# Patient Record
Sex: MALE | Race: WHITE | NOT HISPANIC OR LATINO | URBAN - METROPOLITAN AREA
[De-identification: names, ages, dates, MRNs, and addresses within clinical notes are randomized per-mention and may not be internally consistent; named-entity substitution may affect disease eponyms.]

---

## 2021-02-11 DIAGNOSIS — Z23 ENCOUNTER FOR IMMUNIZATION: ICD-10-CM

## 2023-02-21 NOTE — PROGRESS NOTES
New Patient Consult Note      Chief Complaint   Patient presents with   • Diabetes Type 2      Referring Provider  Serena Ledezma Md  [address] [facility]     History of Present Illness:   Sally Diaz is a 65 y o  male with a history of type 2 diabetes since 1020 With complication of macroalbuminuria, CKD3, denies any retinopathy, neuropathy or Macrovascular complications  With other PMHx of hypertension, hyperlipidemia who presents today for diabetes management  Used to see Dr Alice Woodruff who now retired and therefore patient here to transfer care  We do not have any records from previous endocrine at this time  Patient was first diagnosed with T2DM in since [address] [facility] since diagnosis:- Reports good control for all these years  Reports last HbA1C 11/22 was 6 7%  Medications tried thus far:- Took patient off metformin and started on humalog 50/50- 1 5 months ago possible d/t decline in renal function  Current regime:- Humalog 50:50 30u BID, Jardiance 10mg daily   BG control:- Checks BG BID, Did not bring meter  Fasting- 170-220  Predinner-   Hypoglycemic episodes: Reports low BG <70 once or twice a month, starts to see spots in eyes and sweaty  Hyperglycemia symptoms:- Does report polyuria or polydipsia for a while- patient on bumex, no chest pain, dyspnea or TIAs, no numbness, tingling or pain in extremities  Diet:- Does not follow diabetic diet but stays strict to a diet  Activity- Minimal activity, retired now   Limited d/t OA of the knee    Opthamology: Nov 2022- no retinopathy, no macular edema, saw Dr Sanat Franklin- Ophthalmology associated in [address] podiatry- unsure name   [facility]- Dr Marie Hsieh  Hx of hyperlipidemia:- Yes on lipitor 40mg daily  Hx of hypertension:- Yes, Carvedilol 6 25mg BID, Catapres 0 1mg, Irbesartan 300mg   Last Lipid:- 01/2021  Last urine microalbumin:- 01/2021    Social Hx:- Does not smoke, occasional alcohol use, No other drugs  Lives with spouse     Family HX:- Father had T2DM    Patient Active Problem List   Diagnosis   • Type 2 diabetes mellitus with hyperglycemia, with long-term current use of insulin ([facility] [address] )   • Hypertension   • Mixed hyperlipidemia   • Chronic systolic congestive heart failure (HCC)   • Type 2 diabetes mellitus with stage 3a chronic kidney disease, with long-term current use of insulin (HCC)      Past Medical History:   Diagnosis Date   • Hypertension       Past Surgical History:   Procedure Laterality Date   • BUNIONECTOMY        Family History   Problem Relation Age of Onset   • Heart disease Mother    • Diabetes unspecified Father    • Diabetes unspecified Sister      Social History     Tobacco Use   • Smoking status: Former     Types: Cigarettes     Quit date:      Years since quittin 1   • Smokeless tobacco: Never   Substance Use Topics   • Alcohol use: Not on file     Allergies   Allergen Reactions   • Pioglitazone Swelling     Other reaction(s): lower extremity edema     • Penicillins Hives and Rash         Current Outpatient Medications:   •  amLODIPine (Norvasc) 10 mg tablet, Take 10 mg by mouth daily, Disp: , Rfl:   •  ASPIRIN 81 PO, Take by mouth, Disp: , Rfl:   •  atorvastatin (LIPITOR) 40 mg tablet, TAKE 1 TABLET BY MOUTH AT BEDTIME FOR 90 DAYS, Disp: , Rfl:   •  bumetanide (BUMEX) 1 mg tablet, Take 2 mg by mouth daily, Disp: , Rfl:   •  carvedilol (COREG) 6 25 mg tablet, Take 6 25 mg by mouth 2 (two) times a day, Disp: , Rfl:   •  cloNIDine (CATAPRES) 0 1 mg tablet, TAKE 1 TABLET BY MOUTH TWICE DAILY FOR 30 DAYS, Disp: , Rfl:   •  HumaLOG Mix 50/50 KwikPen (50-50) 100 units/mL injection pen, 30-34 Units 2 (two) times a day with meals 30u in morning and 34u at night, Disp: 15 mL, Rfl: 1  •  irbesartan (AVAPRO) 300 mg tablet, Take 1 tablet by mouth daily, Disp: , Rfl:   •  Jardiance 10 MG TABS tablet, Take 10 mg by mouth daily before breakfast, Disp: , Rfl:   • latanoprost (XALATAN) 0 005 % ophthalmic solution, Administer 1 drop to both eyes daily at bedtime, Disp: , Rfl:   Review of Systems   Constitutional: Negative for diaphoresis, fatigue and unexpected weight change  Respiratory: Negative for shortness of breath  Cardiovascular: Negative for chest pain and palpitations  Gastrointestinal: Negative for constipation and diarrhea  Endocrine: Negative for polydipsia and polyuria  Physical Exam:  Body mass index is 27 86 kg/m²  /52   Pulse (!) 44   Ht 6' 1" (1 854 m)   Wt 95 8 kg (211 lb 3 2 oz)   SpO2 96%   BMI 27 86 kg/m²    Wt Readings from Last 3 Encounters:   02/22/23 95 8 kg (211 lb 3 2 oz)       Physical Exam  Constitutional:       Appearance: Normal appearance  Cardiovascular:      Rate and Rhythm: Normal rate and regular rhythm  Pulses: Pulses are weak  Dorsalis pedis pulses are 1+ on the right side and 1+ on the left side  Pulmonary:      Effort: Pulmonary effort is normal    Abdominal:      General: Abdomen is flat  Palpations: Abdomen is soft  Feet:      Right foot:      Skin integrity: Erythema and dry skin present  No ulcer, skin breakdown, warmth or callus  Left foot:      Skin integrity: Erythema and dry skin present  No ulcer, skin breakdown, warmth or callus  Skin:     General: Skin is warm  Capillary Refill: Capillary refill takes less than 2 seconds  Neurological:      General: No focal deficit present  Mental Status: He is alert  Patient's shoes and socks removed  Right Foot/Ankle   Right Foot Inspection  Skin Exam: skin normal, dry skin, erythema, abnormal color and pre-ulcer  Skin not intact, no warmth, no callus, no maceration, no ulcer and no callus  Toe Exam: ROM and strength within normal limits, swelling, erythema and right toe deformity   No tenderness    Sensory   Vibration: absent  Monofilament testing: diminished    Vascular  Capillary refills: elevated  The right DP pulse is 1+  Left Foot/Ankle  Left Foot Inspection  Skin Exam: skin normal, dry skin, erythema and abnormal color  Skin not intact, no warmth, no maceration, no pre-ulcer, no ulcer and no callus  Toe Exam: ROM and strength within normal limits, swelling, erythema and left toe deformity  Sensory   Vibration: absent  Monofilament testing: diminished    Vascular  Capillary refills: elevated  The left DP pulse is 1+  Assign Risk Category  Deformity present  Loss of protective sensation  Weak pulses  Risk: 2      Labs:   [address]      Component 01/29/2021         Non-HDL Cholesterol -- Load older lab results   CHOLESTEROL, TOTAL 137 Load older lab results   HDL CHOLESTEROL 52 Load older lab results   TRIGLYCERIDES 136 Load older lab results   LDL-CHOLESTEROL 63 Load older lab results   CHOL/HDLC RATIO 2 6 Load older lab results   NON HDL CHOLESTEROL [address]  01/29/2021    Component 01/29/2021         CREATININE, RANDOM URINE 47 Load older lab results   MICROALBUMIN 78 6 Load older lab results   MICROALBUMIN/CREATININE RATIO, RANDOM URINE 1,672 High            Component 10/12/2021 01/29/2021 06/04/2018         HEMOGLOBIN A1c -- 6 9 High      6 7 High        Hemoglobin A1C 7 5 High      -- --     Reports HbA1C 11/22 6 7%    Impression:  1  Type 2 diabetes mellitus with hyperglycemia, with long-term current use of insulin ([facility] [address] )    2  Primary hypertension    3  Mixed hyperlipidemia    4  Chronic systolic congestive heart failure ([address] )    5  Type 2 diabetes mellitus with stage 3a chronic kidney disease, with long-term current use of insulin ([facility] [address] )         Plan:    Jennifer Oppenheim was seen today for diabetes type 2  Diagnoses and all orders for this visit:    Type 2 diabetes mellitus with hyperglycemia, with long-term current use of insulin ([facility])  -     Hemoglobin A1C; Future  -     Comprehensive metabolic panel;  Future  - Lipid panel; Future  -     Microalbumin / creatinine urine ratio; Future    Primary hypertension    Mixed hyperlipidemia    Chronic systolic congestive heart failure (HCC)    Type 2 diabetes mellitus with stage 3a chronic kidney disease, with long-term current use of insulin ([facility])    Other orders  -     HumaLOG Mix 50/50 KwikPen (50-50) 100 units/mL injection pen; 30-34 Units 2 (two) times a day with meals 30u in morning and 34u at night      Patient is a 69yM With PMHx of T2DM since 7333 with complication of macroalbuminuria w CKD3 without any known retinopathy, neuropathy, or CVD/CVA, also positive necrobiosis lipodica- follows with podiatry with other PMHx of hypertension, hyperlipidemia and overweight who was seen today for diabetic care  1) T2DM:- Patient reported last HbA1C 11/22 was 6 7%, current BG reports does indicate higher fasting BG and well controlled BG during the day therefore recommend increasing Humalog 50:50 by 15% at bedtime to 34u and c/w 30u in AM      Plan  - Increase humalog 50:50 30u in AM and 34u in PM  - C/w jardiance 10mg daily   - Obtain labs for HbA1C, lipid and urine studies as we do not have these  - c/w checking BG BID and please bring these to next visit  - c/w follow up with podiatry and also nephrology    Screening:-   Retinopathy- reportedly uptodate, will attempt to obtain records  Nephropathy- will order, c/w jardiance 10mg and also irbesartan   Lipide levels- will order, c/w Lipitor 40mg daily     Goal HbA1C <7%  Goal LDL <100  Goal BP <130/80    2) Hypertension:- BP in clinic 108/52, c/w irbesartan, Catapres, Norvasc and Clonidine  3) Hyperlipidemia:- Will order lipid panel, last 01/21, c/w lipitor 40mg daily for now    - no refills needed today    RTC in 3 months     Discussed with the patient and all questioned fully answered  He will call me if any problems arise      Counseled patient on diagnostic results, prognosis, risk and benefit of treatment options, instruction for management, importance of treatment compliance, Risk  factor reduction and impressions      Adrien Dow MD

## 2023-02-22 ENCOUNTER — OFFICE VISIT (OUTPATIENT)
Dept: ENDOCRINOLOGY | Facility: HOSPITAL | Age: 78
End: 2023-02-22

## 2023-02-22 VITALS
DIASTOLIC BLOOD PRESSURE: 52 MMHG | WEIGHT: 211.2 LBS | BODY MASS INDEX: 27.99 KG/M2 | HEIGHT: 73 IN | HEART RATE: 44 BPM | SYSTOLIC BLOOD PRESSURE: 108 MMHG | OXYGEN SATURATION: 96 %

## 2023-02-22 DIAGNOSIS — E78.2 MIXED HYPERLIPIDEMIA: ICD-10-CM

## 2023-02-22 DIAGNOSIS — N18.31 TYPE 2 DIABETES MELLITUS WITH STAGE 3A CHRONIC KIDNEY DISEASE, WITH LONG-TERM CURRENT USE OF INSULIN (HCC): ICD-10-CM

## 2023-02-22 DIAGNOSIS — E11.22 TYPE 2 DIABETES MELLITUS WITH STAGE 3A CHRONIC KIDNEY DISEASE, WITH LONG-TERM CURRENT USE OF INSULIN (HCC): ICD-10-CM

## 2023-02-22 DIAGNOSIS — Z79.4 TYPE 2 DIABETES MELLITUS WITH HYPERGLYCEMIA, WITH LONG-TERM CURRENT USE OF INSULIN (HCC): Primary | ICD-10-CM

## 2023-02-22 DIAGNOSIS — I50.22 CHRONIC SYSTOLIC CONGESTIVE HEART FAILURE (HCC): ICD-10-CM

## 2023-02-22 DIAGNOSIS — Z79.4 TYPE 2 DIABETES MELLITUS WITH STAGE 3A CHRONIC KIDNEY DISEASE, WITH LONG-TERM CURRENT USE OF INSULIN (HCC): ICD-10-CM

## 2023-02-22 DIAGNOSIS — E11.65 TYPE 2 DIABETES MELLITUS WITH HYPERGLYCEMIA, WITH LONG-TERM CURRENT USE OF INSULIN (HCC): Primary | ICD-10-CM

## 2023-02-22 DIAGNOSIS — I10 PRIMARY HYPERTENSION: ICD-10-CM

## 2023-02-22 RX ORDER — CARVEDILOL 6.25 MG/1
6.25 TABLET ORAL 2 TIMES DAILY
COMMUNITY
Start: 2023-01-02

## 2023-02-22 RX ORDER — AMLODIPINE BESYLATE 10 MG/1
10 TABLET ORAL DAILY
COMMUNITY

## 2023-02-22 RX ORDER — EMPAGLIFLOZIN 10 MG/1
10 TABLET, FILM COATED ORAL
COMMUNITY
Start: 2023-02-07

## 2023-02-22 RX ORDER — IRBESARTAN 300 MG/1
1 TABLET ORAL DAILY
COMMUNITY

## 2023-02-22 RX ORDER — INSULIN LISPRO 100 [IU]/ML
INJECTION, SUSPENSION SUBCUTANEOUS
COMMUNITY
Start: 2023-01-25 | End: 2023-02-22

## 2023-02-22 RX ORDER — CLONIDINE HYDROCHLORIDE 0.1 MG/1
TABLET ORAL
COMMUNITY
Start: 2022-12-18

## 2023-02-22 RX ORDER — INSULIN LISPRO 100 [IU]/ML
30-34 INJECTION, SUSPENSION SUBCUTANEOUS 2 TIMES DAILY WITH MEALS
Qty: 15 ML | Refills: 1 | Status: SHIPPED | OUTPATIENT
Start: 2023-02-22

## 2023-02-22 RX ORDER — BUMETANIDE 1 MG/1
2 TABLET ORAL DAILY
COMMUNITY
Start: 2023-01-07

## 2023-02-22 RX ORDER — ATORVASTATIN CALCIUM 40 MG/1
TABLET, FILM COATED ORAL
COMMUNITY
Start: 2023-01-16

## 2023-02-22 RX ORDER — LATANOPROST 50 UG/ML
1 SOLUTION/ DROPS OPHTHALMIC
COMMUNITY
Start: 2023-01-03

## 2023-02-23 ENCOUNTER — TELEPHONE (OUTPATIENT)
Dept: ADMINISTRATIVE | Facility: OTHER | Age: 78
End: 2023-02-23

## 2023-02-23 NOTE — LETTER
Diabetic Eye Exam Form    Date Requested: 23  Patient: Richard Dai  Patient : 1945   Referring Provider: Jia Hutchinson MD      DIABETIC Eye Exam Date _______________________________      Type of Exam MUST be documented for Diabetic Eye Exams  Please CHECK ONE  Retinal Exam       Dilated Retinal Exam       OCT       Optomap-Iris Exam      Fundus Photography       Left Eye - Please check Retinopathy or No Retinopathy        Exam did show retinopathy    Exam did not show retinopathy       Right Eye - Please check Retinopathy or No Retinopathy       Exam did show retinopathy    Exam did not show retinopathy       Comments __________________________________________________________    Practice Providing Exam ______________________________________________    Exam Performed By (print name) _______________________________________      Provider Signature ___________________________________________________      These reports are needed for  compliance  Please fax this completed form and a copy of the Diabetic Eye Exam report to our office located at [place] [address] as soon as possible via Fax [phone] mario alberto Lee Sine: Phone [phone]  We thank you for your assistance in treating our mutual patient

## 2023-02-23 NOTE — TELEPHONE ENCOUNTER
Upon review of the In Basket request and the patient's chart, initial outreach has been made via fax to facility  Please see Contacts section for details       Thank you  Julia Maguire

## 2023-02-23 NOTE — TELEPHONE ENCOUNTER
----- Message from Shannan Blizzard, [facility] Sharif Becker sent at 2/22/2023  9:30 AM EST -----  Regarding: diabetic eye exam  02/22/23 9:31 AM    Hello, our patient Leisa Damon has had a diabetic eye exam completed/performed  Please assist in updating the patient chart by Marcia Tello  The date of service is 2022      Thank you,  Shannan Blizzard, MA  [facility]

## 2023-02-23 NOTE — TELEPHONE ENCOUNTER
Upon review of the In Basket request we have found that the patient has aged out of the measure and/or the document date is outside of the measure timeframe  Any additional questions or concerns should be emailed to the Practice Liaisons via the appropriate education email address, please do not reply via In Basket      Thank you  Angela Porter

## 2023-02-28 NOTE — TELEPHONE ENCOUNTER
Upon review of the In Basket request we were able to locate, review, and update the patient chart as requested for Diabetic Eye Exam     Any additional questions or concerns should be emailed to the Practice Liaisons via the appropriate education email address, please do not reply via In Basket      Thank you  Rossana Hodge

## 2023-05-24 LAB
LEFT EYE DIABETIC RETINOPATHY: NORMAL
RIGHT EYE DIABETIC RETINOPATHY: NORMAL
SEVERITY (EYE EXAM): NORMAL

## 2023-06-01 LAB
ALBUMIN SERPL-MCNC: 3.8 G/DL (ref 3.6–5.1)
ALBUMIN/CREAT UR: 106 MCG/MG CREAT
ALBUMIN/GLOB SERPL: 1.3 (CALC) (ref 1–2.5)
ALP SERPL-CCNC: 171 U/L (ref 35–144)
ALT SERPL-CCNC: 13 U/L (ref 9–46)
AST SERPL-CCNC: 17 U/L (ref 10–35)
BILIRUB SERPL-MCNC: 0.9 MG/DL (ref 0.2–1.2)
BUN SERPL-MCNC: 66 MG/DL (ref 7–25)
BUN/CREAT SERPL: 22 (CALC) (ref 6–22)
CALCIUM SERPL-MCNC: 9.4 MG/DL (ref 8.6–10.3)
CHLORIDE SERPL-SCNC: 98 MMOL/L (ref 98–110)
CHOLEST SERPL-MCNC: 103 MG/DL
CHOLEST/HDLC SERPL: 2 (CALC)
CO2 SERPL-SCNC: 32 MMOL/L (ref 20–32)
CREAT SERPL-MCNC: 2.96 MG/DL (ref 0.7–1.28)
CREAT UR-MCNC: 34 MG/DL (ref 20–320)
GFR/BSA.PRED SERPLBLD CYS-BASED-ARV: 21 ML/MIN/1.73M2
GLOBULIN SER CALC-MCNC: 3 G/DL (CALC) (ref 1.9–3.7)
GLUCOSE SERPL-MCNC: 189 MG/DL (ref 65–99)
HBA1C MFR BLD: 7.7 % OF TOTAL HGB
HDLC SERPL-MCNC: 51 MG/DL
LDLC SERPL CALC-MCNC: 38 MG/DL (CALC)
MICROALBUMIN UR-MCNC: 3.6 MG/DL
NONHDLC SERPL-MCNC: 52 MG/DL (CALC)
POTASSIUM SERPL-SCNC: 3.8 MMOL/L (ref 3.5–5.3)
PROT SERPL-MCNC: 6.8 G/DL (ref 6.1–8.1)
SODIUM SERPL-SCNC: 142 MMOL/L (ref 135–146)
TRIGL SERPL-MCNC: 67 MG/DL

## 2023-06-05 ENCOUNTER — OFFICE VISIT (OUTPATIENT)
Dept: ENDOCRINOLOGY | Facility: HOSPITAL | Age: 78
End: 2023-06-05
Payer: COMMERCIAL

## 2023-06-05 ENCOUNTER — TELEPHONE (OUTPATIENT)
Dept: ADMINISTRATIVE | Facility: OTHER | Age: 78
End: 2023-06-05

## 2023-06-05 ENCOUNTER — TELEPHONE (OUTPATIENT)
Dept: ENDOCRINOLOGY | Facility: HOSPITAL | Age: 78
End: 2023-06-05

## 2023-06-05 VITALS
WEIGHT: 233 LBS | HEIGHT: 73 IN | DIASTOLIC BLOOD PRESSURE: 70 MMHG | BODY MASS INDEX: 30.88 KG/M2 | SYSTOLIC BLOOD PRESSURE: 118 MMHG | HEART RATE: 57 BPM

## 2023-06-05 DIAGNOSIS — N18.31 TYPE 2 DIABETES MELLITUS WITH STAGE 3A CHRONIC KIDNEY DISEASE, WITH LONG-TERM CURRENT USE OF INSULIN (HCC): ICD-10-CM

## 2023-06-05 DIAGNOSIS — Z79.4 TYPE 2 DIABETES MELLITUS WITH STAGE 3A CHRONIC KIDNEY DISEASE, WITH LONG-TERM CURRENT USE OF INSULIN (HCC): ICD-10-CM

## 2023-06-05 DIAGNOSIS — E11.22 TYPE 2 DIABETES MELLITUS WITH STAGE 3A CHRONIC KIDNEY DISEASE, WITH LONG-TERM CURRENT USE OF INSULIN (HCC): ICD-10-CM

## 2023-06-05 DIAGNOSIS — I50.22 CHRONIC SYSTOLIC CONGESTIVE HEART FAILURE (HCC): ICD-10-CM

## 2023-06-05 DIAGNOSIS — E78.2 MIXED HYPERLIPIDEMIA: ICD-10-CM

## 2023-06-05 DIAGNOSIS — N18.4 CHRONIC RENAL DISEASE, STAGE IV (HCC): ICD-10-CM

## 2023-06-05 DIAGNOSIS — Z79.4 TYPE 2 DIABETES MELLITUS WITH HYPERGLYCEMIA, WITH LONG-TERM CURRENT USE OF INSULIN (HCC): Primary | ICD-10-CM

## 2023-06-05 DIAGNOSIS — I10 PRIMARY HYPERTENSION: ICD-10-CM

## 2023-06-05 DIAGNOSIS — E11.65 TYPE 2 DIABETES MELLITUS WITH HYPERGLYCEMIA, WITH LONG-TERM CURRENT USE OF INSULIN (HCC): Primary | ICD-10-CM

## 2023-06-05 PROCEDURE — 99214 OFFICE O/P EST MOD 30 MIN: CPT | Performed by: STUDENT IN AN ORGANIZED HEALTH CARE EDUCATION/TRAINING PROGRAM

## 2023-06-05 RX ORDER — INSULIN LISPRO 100 [IU]/ML
25 INJECTION, SUSPENSION SUBCUTANEOUS 2 TIMES DAILY WITH MEALS
Qty: 15 ML | Refills: 1 | Status: SHIPPED | OUTPATIENT
Start: 2023-06-05

## 2023-06-05 RX ORDER — BLOOD-GLUCOSE METER
KIT MISCELLANEOUS
COMMUNITY
Start: 2023-05-18

## 2023-06-05 RX ORDER — METOLAZONE 2.5 MG/1
TABLET ORAL
COMMUNITY
Start: 2023-03-31

## 2023-06-05 RX ORDER — LANCETS
EACH MISCELLANEOUS
COMMUNITY
Start: 2023-05-18

## 2023-06-05 RX ORDER — SACUBITRIL AND VALSARTAN 24; 26 MG/1; MG/1
TABLET, FILM COATED ORAL
COMMUNITY
Start: 2023-06-01

## 2023-06-05 RX ORDER — PEN NEEDLE, DIABETIC 31 GX5/16"
NEEDLE, DISPOSABLE MISCELLANEOUS
COMMUNITY
Start: 2023-04-04

## 2023-06-05 NOTE — LETTER
Diabetic Eye Exam Form    Date Requested: 23  Patient: Rahel Coronel  Patient : 1945   Referring Provider: No primary care provider on file  DIABETIC Eye Exam Date _______________________________      Type of Exam MUST be documented for Diabetic Eye Exams  Please CHECK ONE  Retinal Exam       Dilated Retinal Exam       OCT       Optomap-Iris Exam      Fundus Photography       Left Eye - Please check Retinopathy or No Retinopathy        Exam did show retinopathy    Exam did not show retinopathy       Right Eye - Please check Retinopathy or No Retinopathy       Exam did show retinopathy    Exam did not show retinopathy       Comments __________________________________________________________    Practice Providing Exam ______________________________________________    Exam Performed By (print name) _______________________________________      Provider Signature ___________________________________________________      These reports are needed for  compliance  Please fax this completed form and a copy of the Diabetic Eye Exam report to our office located at [place] [address] as soon as possible via Fax [phone] attention Juanis: Phone [phone]  We thank you for your assistance in treating our mutual patient

## 2023-06-05 NOTE — TELEPHONE ENCOUNTER
Patient left a message   He would like to know if he could have a prescription for Januvia instead of the Jardiance since it is much cheaper for him

## 2023-06-05 NOTE — TELEPHONE ENCOUNTER
----- Message from Erika Llanoskamarco Stallings sent at 6/5/2023  9:37 AM EDT -----  Regarding: Eye exam  06/05/23 9:37 AM    Hello, our patient Kristi Wu has had Diabetic Eye Exam completed/performed  Please assist in updating the patient chart by making an External outreach to [facility] facility located in [address] [place] [address]  The date of service is end of May 2023      Thank you,  Shilo Carbone   [facility]: Tobacco Use And Help With Quitting Among Adolescents: Tobacco Screening OR Tobacco Cessation Intervention not Performed Reason Not Otherwise Specified

## 2023-06-05 NOTE — TELEPHONE ENCOUNTER
Upon review of the In Basket request and the patient's chart, initial outreach has been made via fax to facility  Please see Contacts section for details       Thank you  Adrian Chavarria MA

## 2023-06-05 NOTE — PROGRESS NOTES
Follow up Progress Note      Chief Complaint   Patient presents with   • Diabetes Type 2      History of Present Illness:   Rahel Coronel is a 65 y o  male with a history of type 2 diabetes since 8853 With complication of macroalbuminuria, CKD3, denies any retinopathy, neuropathy or Macrovascular complications  With other PMHx of hypertension, hyperlipidemia who presents today for diabetes management who presents today for 3 months follow up  Last visit 02/23    Patient was first diagnosed with T2DM in since [address] since diagnosis:-  HbA1C 11/22 was 6 7%  Medications tried thus far:- Took patient off metformin and started on humalog 50/50- 1 5 months ago possible d/t decline in renal function  Current regime:- Humalog 50/50- self reduced dose to 20-25u (was supposed to be on 30u in Am and 34 in PM)- says he self adjusted his insulin doses just because his No's were good, Jardiance 10mg daily   BG control:- Patient again did not bring his BG logs  Reports checking BG twice a day only- reports stable  120's range  Denies any high or low BG    Hypoglycemic episodes: None  Hyperglycemia symptoms:- Does report polyuria or polydipsia for a while- patient on bumex, no chest pain, dyspnea or TIAs, no numbness, tingling or pain in extremities  Diet:- does not follow diabetic diet  Activity- Minimal activity, retired now  Limited d/t OA of the knee    Opthamology: Nov 2022- no retinopathy, no macular edema, saw Dr Marne Burkitt- Ophthalmology associated in [address] [facility]- unsure name   Last foot exam- 02/23  Follows nephrology- Dr Charlene Chandler  Hx of hyperlipidemia:- Yes on lipitor 40mg daily  Hx of hypertension:- Yes, Carvedilol 6 25mg BID, Catapres 0 1mg, Irbesartan 300mg   Last Lipid:- 05/23 LDL 38  Last urine microalbumin:- 05/23 Abnormal x 1 106  Last hbA1C 05/23- 7 7%    Social Hx:- Does not smoke, occasional alcohol use, No other drugs  Lives with spouse     Family HX:- Father had T2DM    Patient Active Problem List   Diagnosis   • Type 2 diabetes mellitus with hyperglycemia, with long-term current use of insulin (HCC)   • Hypertension   • Mixed hyperlipidemia   • Chronic systolic congestive heart failure (HCC)   • Type 2 diabetes mellitus with stage 3a chronic kidney disease, with long-term current use of insulin (HCC)      Past Medical History:   Diagnosis Date   • Hypertension       Past Surgical History:   Procedure Laterality Date   • BUNIONECTOMY        Family History   Problem Relation Age of Onset   • Heart disease Mother    • Diabetes unspecified Father    • Diabetes unspecified Sister      Social History     Tobacco Use   • Smoking status: Former     Types: Cigarettes     Quit date:      Years since quittin 4   • Smokeless tobacco: Never   Substance Use Topics   • Alcohol use: Not on file     Allergies   Allergen Reactions   • Pioglitazone Swelling     Other reaction(s): lower extremity edema     • Penicillins Hives and Rash         Current Outpatient Medications:   •  amLODIPine (Norvasc) 10 mg tablet, Take 10 mg by mouth daily, Disp: , Rfl:   •  ASPIRIN 81 PO, Take by mouth, Disp: , Rfl:   •  atorvastatin (LIPITOR) 40 mg tablet, TAKE 1 TABLET BY MOUTH AT BEDTIME FOR 90 DAYS, Disp: , Rfl:   •  B-D ULTRAFINE III SHORT PEN 31G X 8 MM MISC, USE 2 ONCE DAILY, Disp: , Rfl:   •  bumetanide (BUMEX) 1 mg tablet, Take 2 mg by mouth daily, Disp: , Rfl:   •  carvedilol (COREG) 6 25 mg tablet, Take 6 25 mg by mouth 2 (two) times a day, Disp: , Rfl:   •  cloNIDine (CATAPRES) 0 1 mg tablet, TAKE 1 TABLET BY MOUTH TWICE DAILY FOR 30 DAYS, Disp: , Rfl:   •  Entresto 24-26 MG TABS, , Disp: , Rfl:   •  FREESTYLE LITE test strip, USE 1 STRIP TO CHECK GLUCOSE TWICE DAILY AND AS NEEDED, Disp: , Rfl:   •  HumaLOG Mix 50/50 KwikPen (50-50) 100 units/mL injection pen, 30-34 Units 2 (two) times a day with meals 30u in morning and 34u at night, Disp: 15 mL, Rfl: 1  •  irbesartan (AVAPRO) 300 mg tablet, Take 1 tablet "by mouth daily, Disp: , Rfl:   •  Jardiance 10 MG TABS tablet, Take 10 mg by mouth daily before breakfast, Disp: , Rfl:   •  latanoprost (XALATAN) 0 005 % ophthalmic solution, Administer 1 drop to both eyes daily at bedtime, Disp: , Rfl:   •  metolazone (ZAROXOLYN) 2 5 mg tablet, TAKE 1 TABLET BY MOUTH ONCE DAILY TAKE 30 MINUTES PRIOR TO BUMEX DOSE 30 DAYS, Disp: , Rfl:   •  Microlet Lancets MISC, USE 1 TO CHECK GLUCOSE TWICE DAILY, Disp: , Rfl:   Review of Systems   Constitutional: Negative for diaphoresis, fatigue and unexpected weight change  Respiratory: Negative for shortness of breath  Cardiovascular: Negative for chest pain and palpitations  Gastrointestinal: Negative for constipation and diarrhea  Endocrine: Negative for polydipsia and polyuria  Physical Exam:  Body mass index is 27 86 kg/m²   6' 1\" (1 854 m)   BMI 27 86 kg/m²    Wt Readings from Last 3 Encounters:   02/22/23 95 8 kg (211 lb 3 2 oz)       Physical Exam  Constitutional:       Appearance: Normal appearance  Cardiovascular:      Rate and Rhythm: Normal rate and regular rhythm  Pulses: Pulses are weak  Dorsalis pedis pulses are 1+ on the right side and 1+ on the left side  Pulmonary:      Effort: Pulmonary effort is normal    Abdominal:      General: Abdomen is flat  Palpations: Abdomen is soft  Feet:      Right foot:      Skin integrity: Erythema and dry skin present  No ulcer, skin breakdown, warmth or callus  Left foot:      Skin integrity: Erythema and dry skin present  No ulcer, skin breakdown, warmth or callus  Skin:     General: Skin is warm  Capillary Refill: Capillary refill takes less than 2 seconds  Neurological:      General: No focal deficit present  Mental Status: He is alert       Labs:      Latest Reference Range & Units 05/31/23 13:31   Sodium 135 - 146 mmol/L 142   Potassium 3 5 - 5 3 mmol/L 3 8   Chloride 98 - 110 mmol/L 98   CO2 20 - 32 mmol/L 32   BUN 7 - 25 " mg/dL 66 (H)   Creatinine 0 70 - 1 28 mg/dL 2 96 (H)   SL AMB BUN/CREATININE RATIO 6 - 22 (calc) 22   Glucose, Random 65 - 99 mg/dL 189 (H)   Calcium 8 6 - 10 3 mg/dL 9 4   AST 10 - 35 U/L 17   ALT 9 - 46 U/L 13   Alkaline Phosphatase 35 - 144 U/L 171 (H)   Total Protein 6 1 - 8 1 g/dL 6 8   Albumin 3 6 - 5 1 g/dL 3 8   TOTAL BILIRUBIN 0 2 - 1 2 mg/dL 0 9   eGFR > OR = 60 mL/min/1 73m2 21 (L)   Albumin/Globulin Ratio 1 0 - 2 5 (calc) 1 3   Cholesterol <200 mg/dL 103   Triglycerides <150 mg/dL 67   HDL > OR = 40 mg/dL 51   Non-HDL Cholesterol <130 mg/dL (calc) 52   LDL Calculated mg/dL (calc) 38   Chol HDLC Ratio <5 0 (calc) 2 0   Globulin 1 9 - 3 7 g/dL (calc) 3 0   Hemoglobin A1C <5 7 % of total Hgb 7 7 (H)   (H): Data is abnormally high  (L): Data is abnormally low     Latest Reference Range & Units 05/31/23 13:31   EXT Creatinine Urine 20 - 320 mg/dL 34   MICROALBUMIN/CREATININE RATIO <30 mcg/mg creat 106 (H)   MICROALBUM ,U,RANDOM See Note: mg/dL 3 6   (H): Data is abnormally high      [address]      Component 01/29/2021         Non-HDL Cholesterol -- Load older lab results   CHOLESTEROL, TOTAL 137 Load older lab results   HDL CHOLESTEROL 52 Load older lab results   TRIGLYCERIDES 136 Load older lab results   LDL-CHOLESTEROL 63 Load older lab results   CHOL/HDLC RATIO 2 6 Load older lab results   NON HDL CHOLESTEROL [address]  01/29/2021    Component 01/29/2021         CREATININE, RANDOM URINE 47 Load older lab results   MICROALBUMIN 78 6 Load older lab results   MICROALBUMIN/CREATININE RATIO, RANDOM URINE 1,672 High            Component 10/12/2021 01/29/2021 06/04/2018         HEMOGLOBIN A1c -- 6 9 High      6 7 High        Hemoglobin A1C 7 5 High      -- --     Reports HbA1C 11/22 6 7%    Impression:  No diagnosis found  Plan:    There are no diagnoses linked to this encounter    Patient is a 69yM With PMHx of T2DM since 1995 with complication of macroalbuminuria w CKD3 without any known retinopathy, neuropathy, or CVD/CVA, also positive necrobiosis lipodica- follows with podiatry with other PMHx of hypertension, hyperlipidemia and overweight who was seen today for diabetic care  Last visit 02/23  1) T2DM:- Patients most recent HbA1C 05/23 is at 7 7% which is suboptimal but given his age (increases from 6 7% previous to 7 7% now), is acceptable specially given his macroalbuminuria with CKD3 and risk of hypoglycemia  No BG logs to review again  CGM offered- declined by patient  Educated on checking BG more often to obtain more data to prevent raise in A1C and declin in diabetic control  Also discussed importance of not self adjusting his insulin dose leading to labile diabetic control  Patient does have some memory issues and therefore will be less aggressive with HbA1C goals  Given abnormal macroalbuminuria still- would recommend increasing jardiance to 10mg daily  This will also help reduce HbA1C without causing hypoglycemia  No change to insulin regime for now  Discussed sticking to 25u BID for now  Check BG 3-4x daily and bring logs next visit     Plan  - c/w humalog 50:50 25u BID  - Increase jardiance 25mg daily   - bring logs for next visit   - Obtain labs for HbA1C, lipid and urine studies for next visit  - c/w follow up with podiatry and also nephrology    Screening:-   Retinopathy- reportedly uptodate, will attempt to obtain records  Nephropathy- Abnormal x1 at 106 05/23, Increase jardiance to 25mg and c/w also irbesartan   Lipide levels- LDL <100, at goal c/w Lipitor 40mg daily     Goal HbA1C <7%  Goal LDL <100  Goal BP <130/80    2) Hypertension:- BP in clinic 118/70 , c/w irbesartan, Catapres, Norvasc and Clonidine  3) Hyperlipidemia:- LDL at goal <100, c/w lipitor 40mg daily for now      RTC in 3 months     Discussed with the patient and all questioned fully answered  He will call me if any problems arise      Counseled patient on diagnostic results, prognosis, risk and benefit of treatment options, instruction for management, importance of treatment compliance, Risk  factor reduction and impressions      Nita Treadwell MD

## 2023-06-08 NOTE — TELEPHONE ENCOUNTER
Upon review of the In Basket request we were able to locate, review, and update the patient chart as requested for Diabetic Eye Exam     Any additional questions or concerns should be emailed to the Practice Liaisons via the appropriate education email address, please do not reply via In Basket      Thank you  Maye Ibrahim MA

## 2023-06-19 DIAGNOSIS — Z79.4 TYPE 2 DIABETES MELLITUS WITH HYPERGLYCEMIA, WITH LONG-TERM CURRENT USE OF INSULIN (HCC): Primary | ICD-10-CM

## 2023-06-19 DIAGNOSIS — E11.65 TYPE 2 DIABETES MELLITUS WITH HYPERGLYCEMIA, WITH LONG-TERM CURRENT USE OF INSULIN (HCC): Primary | ICD-10-CM

## 2023-06-19 RX ORDER — PERPHENAZINE 16 MG/1
TABLET, FILM COATED ORAL
Qty: 100 STRIP | Refills: 2 | Status: SHIPPED | OUTPATIENT
Start: 2023-06-19

## 2023-08-09 ENCOUNTER — TELEPHONE (OUTPATIENT)
Dept: ENDOCRINOLOGY | Facility: HOSPITAL | Age: 78
End: 2023-08-09

## 2023-08-09 DIAGNOSIS — E11.65 TYPE 2 DIABETES MELLITUS WITH HYPERGLYCEMIA, WITH LONG-TERM CURRENT USE OF INSULIN (HCC): ICD-10-CM

## 2023-08-09 DIAGNOSIS — Z79.4 TYPE 2 DIABETES MELLITUS WITH HYPERGLYCEMIA, WITH LONG-TERM CURRENT USE OF INSULIN (HCC): ICD-10-CM

## 2023-08-09 DIAGNOSIS — N18.31 TYPE 2 DIABETES MELLITUS WITH STAGE 3A CHRONIC KIDNEY DISEASE, WITH LONG-TERM CURRENT USE OF INSULIN (HCC): ICD-10-CM

## 2023-08-09 DIAGNOSIS — E11.22 TYPE 2 DIABETES MELLITUS WITH STAGE 3A CHRONIC KIDNEY DISEASE, WITH LONG-TERM CURRENT USE OF INSULIN (HCC): ICD-10-CM

## 2023-08-09 DIAGNOSIS — Z79.4 TYPE 2 DIABETES MELLITUS WITH STAGE 3A CHRONIC KIDNEY DISEASE, WITH LONG-TERM CURRENT USE OF INSULIN (HCC): ICD-10-CM

## 2023-08-09 RX ORDER — INSULIN LISPRO 100 [IU]/ML
25 INJECTION, SUSPENSION SUBCUTANEOUS 2 TIMES DAILY WITH MEALS
Qty: 15 ML | Refills: 1 | Status: SHIPPED | OUTPATIENT
Start: 2023-08-09 | End: 2023-08-16

## 2023-08-09 NOTE — TELEPHONE ENCOUNTER
I did call the patient and make him aware and I will be call the nurses from [facility] as well. He did tell me that he was in [facility] from 7/1/23 to about 7/4/2023 and then sent into [facility] rehab and then discharged last week.

## 2023-08-09 NOTE — TELEPHONE ENCOUNTER
I was able to get a hold of the patient and he stated that he was discharged about a week and a half ago and upon discharge he has been on the 70/30 humalog 15-25 units with meals. He stated that he has checked his sugars but he does not write them down. Prior to going into the hospital/rehab he was on 50/50 at 25 units twice a day. The nurse from [facility] [phone]) called and left a message concerning all the other discharge summaries and that they do not match what insulin he is currently on. She also mentioned something about a sliding scale but the patient stated that he does not think he needs it. The two sugars that he gave me were from last night at 224 and this morning was 115. He stated that he will need an new prescription of the 70/30 as he is almost out.

## 2023-08-15 NOTE — PROGRESS NOTES
Follow up Progress Note      Chief Complaint   Patient presents with   • Diabetes Type 2      History of Present Illness:   Kmear Vu is a 65 y.o. male with a history of type 2 diabetes since 9560 With complication of macroalbuminuria, CKD3, denies any retinopathy, neuropathy or Macrovascular complications. With other PMHx of hypertension, hyperlipidemia who presents today for diabetes management. Last visit 06/23, however patient was recently admitted to at [facility] from 07/01 to 07/04 and then to rehab. Insulin regime was changed on discharge for unclear reason and patient discharged on Novolog 70/30 rather than 50/50 which is what he previously was on. D/c on novolog 70/30 15-25u with meals? However d/t clear reasoning recommended to resume previous regime last week at Humalog 50:50 25u BID. He comes for post hospital follow up. Patient was admitted for CVA on 07/23    Patient was first diagnosed with T2DM in since [address] since diagnosis:-  HbA1C 11/22 was 6.7%, 05/23 7.7%  Medications tried thus far:- Took patient off metformin and started on humalog 50/50-  d/t decline in renal function  Current regime:- Humalog 50/50 25u BID, Jardiance 25mg daily   BG control:- reports BG high in AM in 220 range and then improves pre dinner to 140's   Did not bring meter  CGM offered- refused    Hypoglycemic episodes: None  Hyperglycemia symptoms:- Does report polyuria or polydipsia for a while- patient on bumex, no chest pain, dyspnea or TIAs, no numbness, tingling or pain in extremities  Diet:- does not follow diabetic diet  Activity- Minimal activity, retired now.  Limited d/t OA of the knee    Opthamology: Nov 2022- no retinopathy, no macular edema, saw Dr Jerrod Gonzalez- Ophthalmology associated in [address] [facility] podiatry- unsure name   Last foot exam- 02/23  Follows nephrology- Dr Chantal Owen  Hx of hyperlipidemia:- Yes on lipitor 40mg daily  Hx of hypertension:- Yes, Carvedilol 6.25mg BID, Catapres 0.1mg, Irbesartan 300mg   Last Lipid:-  LDL 38  Last urine microalbumin:-  abnormal at 350. GFR 43  Last hbA1C - 7.7%    Social Hx:- Does not smoke, occasional alcohol use, No other drugs. Lives with spouse.    Family HX:- Father had T2DM    Patient Active Problem List   Diagnosis   • Type 2 diabetes mellitus with hyperglycemia, with long-term current use of insulin ([address])   • Hypertension   • Mixed hyperlipidemia   • Chronic systolic congestive heart failure (HCC)   • Type 2 diabetes mellitus with stage 3a chronic kidney disease, with long-term current use of insulin (HCC)   • Chronic renal disease, stage IV (HCC)      Past Medical History:   Diagnosis Date   • Hypertension       Past Surgical History:   Procedure Laterality Date   • BUNIONECTOMY        Family History   Problem Relation Age of Onset   • Heart disease Mother    • Diabetes unspecified Father    • Diabetes unspecified Sister      Social History     Tobacco Use   • Smoking status: Former     Types: Cigarettes     Quit date:      Years since quittin.6   • Smokeless tobacco: Never   Substance Use Topics   • Alcohol use: Not on file     Allergies   Allergen Reactions   • Pioglitazone Swelling     Other reaction(s): lower extremity edema     • Penicillins Hives and Rash         Current Outpatient Medications:   •  amLODIPine (Norvasc) 10 mg tablet, Take 10 mg by mouth daily, Disp: , Rfl:   •  apixaban (Eliquis) 5 mg, , Disp: , Rfl:   •  ASPIRIN 81 PO, Take by mouth, Disp: , Rfl:   •  atorvastatin (LIPITOR) 40 mg tablet, TAKE 1 TABLET BY MOUTH AT BEDTIME FOR 90 DAYS, Disp: , Rfl:   •  B-D ULTRAFINE III SHORT PEN 31G X 8 MM MISC, USE 2 ONCE DAILY, Disp: , Rfl:   •  bumetanide (BUMEX) 1 mg tablet, Take 2 mg by mouth daily, Disp: , Rfl:   •  carvedilol (COREG) 6.25 mg tablet, Take 6.25 mg by mouth 2 (two) times a day, Disp: , Rfl:   •  clindamycin (CLEOCIN) 300 MG capsule, Take 300 mg by mouth 4 (four) times a day, Disp: , Rfl:   • cloNIDine (CATAPRES) 0.1 mg tablet, TAKE 1 TABLET BY MOUTH TWICE DAILY FOR 30 DAYS, Disp: , Rfl:   •  Empagliflozin (Jardiance) 25 MG TABS, Take 1 tablet (25 mg total) by mouth every morning, Disp: 100 tablet, Rfl: 1  •  Entresto 24-26 MG TABS, , Disp: , Rfl:   •  glucose blood (Contour Next Test) test strip, Test sugar up to twice a day, Disp: 100 strip, Rfl: 2  •  HumaLOG Mix 50/50 KwikPen (50-50) 100 units/mL injection pen, Inject 25 Units under the skin 2 (two) times a day with meals, Disp: 15 mL, Rfl: 1  •  irbesartan (AVAPRO) 300 mg tablet, Take 1 tablet by mouth daily, Disp: , Rfl:   •  latanoprost (XALATAN) 0.005 % ophthalmic solution, Administer 1 drop to both eyes daily at bedtime, Disp: , Rfl:   •  lisinopril (ZESTRIL) 5 mg tablet, , Disp: , Rfl:   •  magnesium oxide (MAG-OX) 400 mg tablet, Take 400 mg by mouth Three times a day, Disp: , Rfl:   •  metolazone (ZAROXOLYN) 2.5 mg tablet, TAKE 1 TABLET BY MOUTH ONCE DAILY TAKE 30 MINUTES PRIOR TO BUMEX DOSE 30 DAYS, Disp: , Rfl:   •  metoprolol succinate (TOPROL-XL) 25 mg 24 hr tablet, , Disp: , Rfl:   •  Microlet Lancets MISC, USE 1 TO CHECK GLUCOSE TWICE DAILY, Disp: , Rfl:   •  potassium chloride (K-DUR,KLOR-CON) 10 mEq tablet, Take 10 mEq by mouth daily, Disp: , Rfl:   •  tamsulosin (FLOMAX) 0.4 mg, Take 0.4 mg by mouth, Disp: , Rfl:   Review of Systems   Constitutional: Negative for diaphoresis, fatigue and unexpected weight change. Respiratory: Negative for shortness of breath. Cardiovascular: Negative for chest pain and palpitations. Gastrointestinal: Negative for constipation and diarrhea. Endocrine: Negative for polydipsia and polyuria. Physical Exam:  Body mass index is 29.55 kg/m².   /60   Pulse 74   Ht 6' 1" (1.854 m)   Wt 102 kg (224 lb)   SpO2 98%   BMI 29.55 kg/m²    Wt Readings from Last 3 Encounters:   08/16/23 102 kg (224 lb)   06/05/23 106 kg (233 lb)   02/22/23 95.8 kg (211 lb 3.2 oz)       Physical Exam  Constitutional:       Appearance: Normal appearance. Cardiovascular:      Rate and Rhythm: Normal rate and regular rhythm. Pulses:           Dorsalis pedis pulses are 1+ on the right side and 1+ on the left side. Pulmonary:      Effort: Pulmonary effort is normal.   Abdominal:      General: Abdomen is flat. Palpations: Abdomen is soft. Feet:      Right foot:      Skin integrity: Erythema and dry skin present. No ulcer, skin breakdown, warmth or callus. Left foot:      Skin integrity: Erythema and dry skin present. No ulcer, skin breakdown, warmth or callus. Skin:     General: Skin is warm. Capillary Refill: Capillary refill takes less than 2 seconds. Neurological:      General: No focal deficit present. Mental Status: He is alert.      Labs:      Latest Reference Range & Units 05/31/23 13:31   Sodium 135 - 146 mmol/L 142   Potassium 3.5 - 5.3 mmol/L 3.8   Chloride 98 - 110 mmol/L 98   CO2 20 - 32 mmol/L 32   BUN 7 - 25 mg/dL 66 (H)   Creatinine 0.70 - 1.28 mg/dL 2.96 (H)   SL AMB BUN/CREATININE RATIO 6 - 22 (calc) 22   Glucose, Random 65 - 99 mg/dL 189 (H)   Calcium 8.6 - 10.3 mg/dL 9.4   AST 10 - 35 U/L 17   ALT 9 - 46 U/L 13   Alkaline Phosphatase 35 - 144 U/L 171 (H)   Total Protein 6.1 - 8.1 g/dL 6.8   Albumin 3.6 - 5.1 g/dL 3.8   TOTAL BILIRUBIN 0.2 - 1.2 mg/dL 0.9   eGFR > OR = 60 mL/min/1.73m2 21 (L)   Albumin/Globulin Ratio 1.0 - 2.5 (calc) 1.3   Cholesterol <200 mg/dL 103   Triglycerides <150 mg/dL 67   HDL > OR = 40 mg/dL 51   Non-HDL Cholesterol <130 mg/dL (calc) 52   LDL Calculated mg/dL (calc) 38   Chol HDLC Ratio <5.0 (calc) 2.0   Globulin 1.9 - 3.7 g/dL (calc) 3.0   Hemoglobin A1C <5.7 % of total Hgb 7.7 (H)   (H): Data is abnormally high  (L): Data is abnormally low     Latest Reference Range & Units 05/31/23 13:31   EXT Creatinine Urine 20 - 320 mg/dL 34   MICROALBUMIN/CREATININE RATIO <30 mcg/mg creat 106 (H)   MICROALBUM.,U,RANDOM See Note: mg/dL 3.6   (H): Data is abnormally high      LIPID  [facility]. [address]      Component 01/29/2021         Non-HDL Cholesterol -- Load older lab results   CHOLESTEROL, TOTAL 137 Load older lab results   HDL CHOLESTEROL 52 Load older lab results   TRIGLYCERIDES 136 Load older lab results   LDL-CHOLESTEROL 63 Load older lab results   CHOL/HDLC RATIO 2.6 Load older lab results   NON HDL CHOLESTEROL [address] [facility]  01/29/2021    Component 01/29/2021         CREATININE, RANDOM URINE 47 Load older lab results   MICROALBUMIN 78.6 Load older lab results   MICROALBUMIN/CREATININE RATIO, RANDOM URINE 1,672 High            Component 10/12/2021 01/29/2021 06/04/2018         HEMOGLOBIN A1c -- 6.9 High      6.7 High        Hemoglobin A1C 7.5 High      -- --     Reports HbA1C 11/22 6.7%    Impression:  1. Type 2 diabetes mellitus with hyperglycemia, with long-term current use of insulin ([address])    2. Type 2 diabetes mellitus with stage 3a chronic kidney disease, with long-term current use of insulin ([address])    3. Mixed hyperlipidemia    4. Primary hypertension    5. Chronic renal disease, stage IV ([address])         Plan:    David Gibbons was seen today for diabetes type 2. Diagnoses and all orders for this visit:    Type 2 diabetes mellitus with hyperglycemia, with long-term current use of insulin ([address])    Type 2 diabetes mellitus with stage 3a chronic kidney disease, with long-term current use of insulin ([address])    Mixed hyperlipidemia    Primary hypertension    Chronic renal disease, stage IV ([address])      Patient is a 69yM With PMHx of T2DM since 5623 with complication of macroalbuminuria w CKD3 without any known retinopathy, CVA 07/23, neuropathy, also positive necrobiosis lipodica- follows with podiatry with other PMHx of hypertension, hyperlipidemia and overweight who was seen today for diabetic care. Last visit 06/23.      1) T2DM:- Patients last HbA1C 05/23 was 7.7% which is suboptimal but given his age is acceptable. No BG logs to review again, Cgm offered he refused. Reportedly high BG in AM partly d/t snacking as well vs also seen on days not snacking. Hence will increase Humalog 50:50 mildly for bedtime from 25u to 28u. Will also start patient on trulicity to help further with glycemic control and also stroke prevention. Side effects of GI issues discussed with patient. C/w BG check BID- bring to next visit     Plan  - c/w humalog 50:50 25u in Am and increase to 28u in PM  - c/w jardiance 25mg daily   - Start trulicity 2.39XL weekly   - bring logs for next visit   - Obtain labs for HbA1C, lipid and urine studies for next visit  - c/w follow up with podiatry and also nephrology     Screening:-   Retinopathy- reportedly uptodate, will attempt to obtain records  Nephropathy- Abnormal 350, c/w nephrology follow up, c/w jardiance to 25mg and c/w also irbesartan   Lipide levels- LDL <100, at goal c/w Lipitor 40mg daily       2) Hypertension:- BP in clinic 102/60, c/w irbesartan, Catapres, Norvasc and Clonidine  3) Hyperlipidemia:- LDL at goal <100, c/w lipitor 40mg daily for now    RTC in 3 months     Discussed with the patient and all questioned fully answered. He will call me if any problems arise.     Counseled patient on diagnostic results, prognosis, risk and benefit of treatment options, instruction for management, importance of treatment compliance, Risk  factor reduction and impressions      Angel Luis Miles MD

## 2023-08-16 ENCOUNTER — OFFICE VISIT (OUTPATIENT)
Dept: ENDOCRINOLOGY | Facility: HOSPITAL | Age: 78
End: 2023-08-16
Payer: COMMERCIAL

## 2023-08-16 VITALS
SYSTOLIC BLOOD PRESSURE: 102 MMHG | HEIGHT: 73 IN | OXYGEN SATURATION: 98 % | BODY MASS INDEX: 29.69 KG/M2 | WEIGHT: 224 LBS | HEART RATE: 74 BPM | DIASTOLIC BLOOD PRESSURE: 60 MMHG

## 2023-08-16 DIAGNOSIS — E78.2 MIXED HYPERLIPIDEMIA: ICD-10-CM

## 2023-08-16 DIAGNOSIS — E11.22 TYPE 2 DIABETES MELLITUS WITH STAGE 3A CHRONIC KIDNEY DISEASE, WITH LONG-TERM CURRENT USE OF INSULIN (HCC): ICD-10-CM

## 2023-08-16 DIAGNOSIS — I10 PRIMARY HYPERTENSION: ICD-10-CM

## 2023-08-16 DIAGNOSIS — E11.65 TYPE 2 DIABETES MELLITUS WITH HYPERGLYCEMIA, WITH LONG-TERM CURRENT USE OF INSULIN (HCC): Primary | ICD-10-CM

## 2023-08-16 DIAGNOSIS — Z79.4 TYPE 2 DIABETES MELLITUS WITH HYPERGLYCEMIA, WITH LONG-TERM CURRENT USE OF INSULIN (HCC): Primary | ICD-10-CM

## 2023-08-16 DIAGNOSIS — N18.31 TYPE 2 DIABETES MELLITUS WITH STAGE 3A CHRONIC KIDNEY DISEASE, WITH LONG-TERM CURRENT USE OF INSULIN (HCC): ICD-10-CM

## 2023-08-16 DIAGNOSIS — Z79.4 TYPE 2 DIABETES MELLITUS WITH STAGE 3A CHRONIC KIDNEY DISEASE, WITH LONG-TERM CURRENT USE OF INSULIN (HCC): ICD-10-CM

## 2023-08-16 DIAGNOSIS — N18.4 CHRONIC RENAL DISEASE, STAGE IV (HCC): ICD-10-CM

## 2023-08-16 PROCEDURE — 99214 OFFICE O/P EST MOD 30 MIN: CPT | Performed by: STUDENT IN AN ORGANIZED HEALTH CARE EDUCATION/TRAINING PROGRAM

## 2023-08-16 RX ORDER — TAMSULOSIN HYDROCHLORIDE 0.4 MG/1
0.4 CAPSULE ORAL
COMMUNITY
Start: 2023-07-07

## 2023-08-16 RX ORDER — POTASSIUM CHLORIDE 750 MG/1
10 TABLET, EXTENDED RELEASE ORAL DAILY
COMMUNITY
Start: 2023-07-09

## 2023-08-16 RX ORDER — INSULIN LISPRO 100 [IU]/ML
25-28 INJECTION, SUSPENSION SUBCUTANEOUS 2 TIMES DAILY WITH MEALS
Qty: 15 ML | Refills: 1 | Status: SHIPPED | OUTPATIENT
Start: 2023-08-16

## 2023-08-16 RX ORDER — METOPROLOL SUCCINATE 25 MG/1
TABLET, EXTENDED RELEASE ORAL
COMMUNITY
Start: 2023-07-31

## 2023-08-16 RX ORDER — MAGNESIUM OXIDE 400 MG/1
400 TABLET ORAL 3 TIMES DAILY
COMMUNITY
Start: 2023-07-07

## 2023-08-16 RX ORDER — LISINOPRIL 5 MG/1
TABLET ORAL
COMMUNITY
Start: 2023-07-31

## 2023-08-16 RX ORDER — CLINDAMYCIN HYDROCHLORIDE 300 MG/1
300 CAPSULE ORAL 4 TIMES DAILY
COMMUNITY
Start: 2023-08-10

## 2023-08-16 RX ORDER — DULAGLUTIDE 0.75 MG/.5ML
0.75 INJECTION, SOLUTION SUBCUTANEOUS
Qty: 6 ML | Refills: 1 | Status: SHIPPED | OUTPATIENT
Start: 2023-08-16

## 2023-08-16 NOTE — PATIENT INSTRUCTIONS
Increase humalog 50:5- 25u in AM and 28u in PM  -Start trulicity 2.89AR weekly   Check BG BID  Labs in 3 months

## 2023-08-21 ENCOUNTER — TELEPHONE (OUTPATIENT)
Dept: ENDOCRINOLOGY | Facility: HOSPITAL | Age: 78
End: 2023-08-21

## 2023-08-21 DIAGNOSIS — E11.65 TYPE 2 DIABETES MELLITUS WITH HYPERGLYCEMIA, WITH LONG-TERM CURRENT USE OF INSULIN (HCC): Primary | ICD-10-CM

## 2023-08-21 DIAGNOSIS — Z79.4 TYPE 2 DIABETES MELLITUS WITH HYPERGLYCEMIA, WITH LONG-TERM CURRENT USE OF INSULIN (HCC): Primary | ICD-10-CM

## 2023-08-21 RX ORDER — PEN NEEDLE, DIABETIC 31 GX5/16"
NEEDLE, DISPOSABLE MISCELLANEOUS
Qty: 200 EACH | Refills: 3 | Status: SHIPPED | OUTPATIENT
Start: 2023-08-21

## 2023-08-21 NOTE — TELEPHONE ENCOUNTER
The visiting nurse of this patient called and said that the patient needs a refill on pen needles. Thank you.

## 2023-08-23 ENCOUNTER — TELEPHONE (OUTPATIENT)
Dept: ENDOCRINOLOGY | Facility: HOSPITAL | Age: 78
End: 2023-08-23

## 2023-08-23 DIAGNOSIS — N18.31 TYPE 2 DIABETES MELLITUS WITH STAGE 3A CHRONIC KIDNEY DISEASE, WITH LONG-TERM CURRENT USE OF INSULIN (HCC): Primary | ICD-10-CM

## 2023-08-23 DIAGNOSIS — E11.22 TYPE 2 DIABETES MELLITUS WITH STAGE 3A CHRONIC KIDNEY DISEASE, WITH LONG-TERM CURRENT USE OF INSULIN (HCC): Primary | ICD-10-CM

## 2023-08-23 DIAGNOSIS — Z79.4 TYPE 2 DIABETES MELLITUS WITH STAGE 3A CHRONIC KIDNEY DISEASE, WITH LONG-TERM CURRENT USE OF INSULIN (HCC): Primary | ICD-10-CM

## 2023-08-23 RX ORDER — GLUCAGON 3 MG/1
3 POWDER NASAL AS NEEDED
Qty: 1 EACH | Refills: 0 | Status: SHIPPED | OUTPATIENT
Start: 2023-08-23

## 2023-08-23 NOTE — TELEPHONE ENCOUNTER
The patient's spouse called and stated that Heidi Norwood is in [facility] again due to low blood sugars. She wondered about a nasal spray and if she could get a prescription. She stated that she is very scared at this point due to his issues with his low.

## 2023-08-23 NOTE — TELEPHONE ENCOUNTER
I was able to call Jemima back and made her aware of Dr. Obed Arzate recommendations and that the prescription was sent to her pharmacy.   She will call and let us know numbers when the patient is home and situated

## 2023-09-01 DIAGNOSIS — E11.65 TYPE 2 DIABETES MELLITUS WITH HYPERGLYCEMIA, WITH LONG-TERM CURRENT USE OF INSULIN (HCC): ICD-10-CM

## 2023-09-01 DIAGNOSIS — Z79.4 TYPE 2 DIABETES MELLITUS WITH HYPERGLYCEMIA, WITH LONG-TERM CURRENT USE OF INSULIN (HCC): ICD-10-CM

## 2023-09-05 RX ORDER — PEN NEEDLE, DIABETIC 31 GX5/16"
NEEDLE, DISPOSABLE MISCELLANEOUS
Qty: 200 EACH | Refills: 3 | Status: SHIPPED | OUTPATIENT
Start: 2023-09-05

## 2023-09-20 ENCOUNTER — OFFICE VISIT (OUTPATIENT)
Dept: ENDOCRINOLOGY | Facility: HOSPITAL | Age: 78
End: 2023-09-20
Payer: COMMERCIAL

## 2023-09-20 VITALS
DIASTOLIC BLOOD PRESSURE: 78 MMHG | BODY MASS INDEX: 28.68 KG/M2 | WEIGHT: 216.4 LBS | SYSTOLIC BLOOD PRESSURE: 122 MMHG | HEIGHT: 73 IN

## 2023-09-20 DIAGNOSIS — N18.4 CHRONIC RENAL DISEASE, STAGE IV (HCC): ICD-10-CM

## 2023-09-20 DIAGNOSIS — E78.2 MIXED HYPERLIPIDEMIA: ICD-10-CM

## 2023-09-20 DIAGNOSIS — Z79.4 TYPE 2 DIABETES MELLITUS WITH HYPERGLYCEMIA, WITH LONG-TERM CURRENT USE OF INSULIN (HCC): Primary | ICD-10-CM

## 2023-09-20 DIAGNOSIS — E11.65 TYPE 2 DIABETES MELLITUS WITH HYPERGLYCEMIA, WITH LONG-TERM CURRENT USE OF INSULIN (HCC): Primary | ICD-10-CM

## 2023-09-20 DIAGNOSIS — Z79.4 TYPE 2 DIABETES MELLITUS WITH STAGE 3A CHRONIC KIDNEY DISEASE, WITH LONG-TERM CURRENT USE OF INSULIN (HCC): ICD-10-CM

## 2023-09-20 DIAGNOSIS — E11.22 TYPE 2 DIABETES MELLITUS WITH STAGE 3A CHRONIC KIDNEY DISEASE, WITH LONG-TERM CURRENT USE OF INSULIN (HCC): ICD-10-CM

## 2023-09-20 DIAGNOSIS — N18.31 TYPE 2 DIABETES MELLITUS WITH STAGE 3A CHRONIC KIDNEY DISEASE, WITH LONG-TERM CURRENT USE OF INSULIN (HCC): ICD-10-CM

## 2023-09-20 PROCEDURE — 99214 OFFICE O/P EST MOD 30 MIN: CPT | Performed by: STUDENT IN AN ORGANIZED HEALTH CARE EDUCATION/TRAINING PROGRAM

## 2023-09-20 RX ORDER — CARVEDILOL 3.12 MG/1
TABLET ORAL
COMMUNITY
Start: 2023-09-01

## 2023-09-20 NOTE — PROGRESS NOTES
Follow up Progress Note      Chief Complaint   Patient presents with   • Diabetes Type 2      History of Present Illness:   Areli Avila is a 77 y.o. male with a history of type 2 diabetes since 5111 With complication of macroalbuminuria, CKD3, denies any retinopathy, neuropathy or Macrovascular complications. With other PMHx of hypertension, hyperlipidemia who presents today for diabetes management. Last visit 08/23 post hospitalization for CVA with change in regime. Given alternative regime with labile BG, resumed home regime. Presents for follow up since. Patient was first diagnosed with T2DM in since [address] since diagnosis:-  HbA1C 11/22 was 6.7%, 05/23 7.7%  Medications tried thus far:- Took patient off metformin and started on humalog 50/50-  d/t decline in renal function  Current regime:- Humalog 50/50 25u in AM and 28u in PM, Jardiance 42BV daily, Has trulicity but didn't start using it, when asked why says he just didn't. BG control:-  Has dexcom but unable to look at information d.t didn't bring reader    Hypoglycemic episodes: None  Hyperglycemia symptoms:- Does report polyuria or polydipsia for a while- patient on bumex, no chest pain, dyspnea or TIAs, no numbness, tingling or pain in extremities  Diet:- does not follow diabetic diet  Activity- Minimal activity, retired now. Limited d/t OA of the knee    Opthamology: Nov 2022- no retinopathy, no macular edema, saw Dr Mary Dominguez- [place] [id number] [facility] pod[facility]ry- unsure name   Last foot exam- 02/23  Follows nephrology- Dr Edgar Sweeney  Hx of hyperlipidemia:- Yes on lipitor 40mg daily  Hx of hypertension:- Yes, Carvedilol 6.25mg BID, Catapres 0.1mg, Irbesartan 300mg   Last Lipid:- 08/23 LDL 38  Last urine microalbumin:- 08/23 abnormal at 350. GFR 43  Last hbA1C 06/23 8.4%, 05/23- 7.7%    Social Hx:- Does not smoke, occasional alcohol use, No other drugs. Lives with spouse.    Family HX:- Father had T2DM    Patient Active Problem List   Diagnosis   • Type 2 diabetes mellitus with hyperglycemia, with long-term current use of insulin (HCC)   • Hypertension   • Mixed hyperlipidemia   • Chronic systolic congestive heart failure (HCC)   • Type 2 diabetes mellitus with stage 3a chronic kidney disease, with long-term current use of insulin (HCC)   • Chronic renal disease, stage IV (HCC)      Past Medical History:   Diagnosis Date   • Hypertension       Past Surgical History:   Procedure Laterality Date   • BUNIONECTOMY        Family History   Problem Relation Age of Onset   • Heart disease Mother    • Diabetes unspecified Father    • Diabetes unspecified Sister      Social History     Tobacco Use   • Smoking status: Former     Types: Cigarettes     Quit date:      Years since quittin.7   • Smokeless tobacco: Never   Substance Use Topics   • Alcohol use: Not on file     Allergies   Allergen Reactions   • Pioglitazone Swelling     Other reaction(s): lower extremity edema     • Penicillins Hives and Rash         Current Outpatient Medications:   •  amLODIPine (Norvasc) 10 mg tablet, Take 10 mg by mouth daily, Disp: , Rfl:   •  apixaban (Eliquis) 5 mg, , Disp: , Rfl:   •  atorvastatin (LIPITOR) 40 mg tablet, TAKE 1 TABLET BY MOUTH AT BEDTIME FOR 90 DAYS, Disp: , Rfl:   •  B-D ULTRAFINE III SHORT PEN 31G X 8 MM MISC, Use 2 pen needles daily, Disp: 200 each, Rfl: 3  •  bumetanide (BUMEX) 1 mg tablet, Take 2 mg by mouth daily, Disp: , Rfl:   •  carvedilol (COREG) 3.125 mg tablet, TAKE 1 TABLET BY MOUTH EVERY 12 HOURS. PER PATIENT, Disp: , Rfl:   •  cloNIDine (CATAPRES) 0.1 mg tablet, TAKE 1 TABLET BY MOUTH TWICE DAILY FOR 30 DAYS, Disp: , Rfl:   •  Continuous Blood Gluc  (FreeStyle [place] 2 [place]) SHRUTI, Use to check blood sugars 4 times a day, Disp: 1 each, Rfl: 0  •  Continuous Blood Gluc Sensor (FreeStyle [place]ne 2 Sensor) MISC, Change sensor every 14 days, Disp: 2 each, Rfl: 3  •  Empagliflozin (Jardiance) 25 MG TABS, Take 1 tablet (25 mg total) by mouth every morning, Disp: 100 tablet, Rfl: 1  •  Entresto 24-26 MG TABS, , Disp: , Rfl:   •  Glucagon (Baqsimi One Pack) 3 MG/DOSE POWD, 3 mg into each nostril if needed (hypoglycemia <70), Disp: 1 each, Rfl: 0  •  glucose blood (Contour Next Test) test strip, Test sugar up to twice a day, Disp: 100 strip, Rfl: 2  •  HumaLOG Mix 50/50 KwikPen (50-50) 100 units/mL injection pen, Inject 25-28 Units under the skin 2 (two) times a day with meals 25u in AM and 28u in PM, Disp: 15 mL, Rfl: 1  •  irbesartan (AVAPRO) 300 mg tablet, Take 1 tablet by mouth daily, Disp: , Rfl:   •  latanoprost (XALATAN) 0.005 % ophthalmic solution, Administer 1 drop to both eyes daily at bedtime, Disp: , Rfl:   •  magnesium oxide (MAG-OX) 400 mg tablet, Take 400 mg by mouth Three times a day, Disp: , Rfl:   •  Microlet Lancets MISC, USE 1 TO CHECK GLUCOSE TWICE DAILY, Disp: , Rfl:   •  potassium chloride (K-DUR,KLOR-CON) 10 mEq tablet, Take 10 mEq by mouth daily, Disp: , Rfl:   •  ASPIRIN 81 PO, Take by mouth (Patient not taking: Reported on 9/20/2023), Disp: , Rfl:   •  clindamycin (CLEOCIN) 300 MG capsule, Take 300 mg by mouth 4 (four) times a day (Patient not taking: Reported on 9/20/2023), Disp: , Rfl:   •  dulaglutide (Trulicity) 8.22 YW/5.5HL injection, Inject 0.5 mL (0.75 mg total) under the skin every 7 days (Patient not taking: Reported on 9/20/2023), Disp: 6 mL, Rfl: 1  •  lisinopril (ZESTRIL) 5 mg tablet, , Disp: , Rfl:   •  metolazone (ZAROXOLYN) 2.5 mg tablet, TAKE 1 TABLET BY MOUTH ONCE DAILY TAKE 30 MINUTES PRIOR TO BUMEX DOSE 30 DAYS (Patient not taking: Reported on 9/20/2023), Disp: , Rfl:   •  metoprolol succinate (TOPROL-XL) 25 mg 24 hr tablet, , Disp: , Rfl:   •  tamsulosin (FLOMAX) 0.4 mg, Take 0.4 mg by mouth (Patient not taking: Reported on 9/20/2023), Disp: , Rfl:   Review of Systems   Constitutional: Negative for diaphoresis, fatigue and unexpected weight change.    Respiratory: Negative for shortness of breath. Cardiovascular: Negative for chest pain and palpitations. Gastrointestinal: Negative for constipation and diarrhea. Endocrine: Negative for polydipsia and polyuria. Physical Exam:  Body mass index is 28.55 kg/m². Ht 6' 1" (1.854 m)   Wt 98.2 kg (216 lb 6.4 oz)   BMI 28.55 kg/m²    Wt Readings from Last 3 Encounters:   09/20/23 98.2 kg (216 lb 6.4 oz)   08/16/23 102 kg (224 lb)   06/05/23 106 kg (233 lb)       Physical Exam  Constitutional:       Appearance: Normal appearance. Cardiovascular:      Rate and Rhythm: Normal rate and regular rhythm. Pulses:           Dorsalis pedis pulses are 1+ on the right side and 1+ on the left side. Pulmonary:      Effort: Pulmonary effort is normal.   Abdominal:      General: Abdomen is flat. Palpations: Abdomen is soft. Feet:      Right foot:      Skin integrity: Erythema and dry skin present. No ulcer, skin breakdown, warmth or callus. Left foot:      Skin integrity: Erythema and dry skin present. No ulcer, skin breakdown, warmth or callus. Skin:     General: Skin is warm. Capillary Refill: Capillary refill takes less than 2 seconds. Neurological:      General: No focal deficit present. Mental Status: He is alert. Labs:      Ref Range & Units 9/6/23  9:34 AM   TSH 0.40 - 4.50 mIU/L 1.69    Narrative     Ref Range & Units 9/6/23  9:34 AM   Creatinine, random urine 20 - 320 mg/dL 39    MICROALBUMIN See Note: mg/dL 7.0    Comment: Reference Range:     Reference Range   Not established   Microalbumin/creatinine ratio, random urine <30 mcg/mg creat 179 High     Comment:    The ADA defines abnormalities in albumin   excretion as follows:      Albuminuria Category        Result (mcg/mg creatinine)     Normal to Mildly increased   <30   Moderately increased            Severely increased           > OR = 300     The ADA recommends that at least two of three   specimens collected within a 3-6 month period be abnormal before considering a patient to be   within a diagnostic category. Ref Range & Units 9/6/23  9:34 AM   Cholesterol, total <200 mg/dL 124    Hdl cholesterol > OR = 40 mg/dL 59    Triglycerides <150 mg/dL 49    LDL-Cholesterol mg/dL (calc) 51    Comment: Reference range: <100     Desirable range <100 mg/dL for primary prevention;     <70 mg/dL for patients with CHD or diabetic patients   with > or = 2 CHD risk factors. LDL-C is now calculated using the Dung-Cee   calculation, which is a validated novel method providing   better accuracy than the Friedewald equation in the   estimation of LDL-C. Rodrigue Hendrix et al. [facility] [place]. 2375;207(49): [phone]   (http://[employer]. [employer]/faq/NWR906)   Chol/HDLC Ratio <5.0 (calc) 2.1    Non HDL Cholesterol <130 mg/dL (calc) 65    Comment: For patients with diabetes plus 1 major ASCVD risk   factor, treating to a non-HDL-C goal of <100 mg/dL   (LDL-C of <70 mg/dL) is considered a therapeutic   option.    Narrative    FASTING:YES     FASTING: YES       Latest Reference Range & Units 05/31/23 13:31   Sodium 135 - 146 mmol/L 142   Potassium 3.5 - 5.3 mmol/L 3.8   Chloride 98 - 110 mmol/L 98   CO2 20 - 32 mmol/L 32   BUN 7 - 25 mg/dL 66 (H)   Creatinine 0.70 - 1.28 mg/dL 2.96 (H)   SL AMB BUN/CREATININE RATIO 6 - 22 (calc) 22   Glucose, Random 65 - 99 mg/dL 189 (H)   Calcium 8.6 - 10.3 mg/dL 9.4   AST 10 - 35 U/L 17   ALT 9 - 46 U/L 13   Alkaline Phosphatase 35 - 144 U/L 171 (H)   Total Protein 6.1 - 8.1 g/dL 6.8   Albumin 3.6 - 5.1 g/dL 3.8   TOTAL BILIRUBIN 0.2 - 1.2 mg/dL 0.9   eGFR > OR = 60 mL/min/1.73m2 21 (L)   Albumin/Globulin Ratio 1.0 - 2.5 (calc) 1.3   Cholesterol <200 mg/dL 103   Triglycerides <150 mg/dL 67   HDL > OR = 40 mg/dL 51   Non-HDL Cholesterol <130 mg/dL (calc) 52   LDL Calculated mg/dL (calc) 38   Chol HDLC Ratio <5.0 (calc) 2.0   Globulin 1.9 - 3.7 g/dL (calc) 3.0   Hemoglobin A1C <5.7 % of total Hgb 7.7 (H)   (H): Data is abnormally high  (L): Data is abnormally low     Latest Reference Range & Units 05/31/23 13:31   EXT Creatinine Urine 20 - 320 mg/dL 34   MICROALBUMIN/CREATININE RATIO <30 mcg/mg creat 106 (H)   MICROALBUM.,U,RANDOM See Note: mg/dL 3.6   (H): Data is abnormally high      [address] [facility]      Component 01/29/2021         Non-HDL Cholesterol -- Load older lab results   CHOLESTEROL, TOTAL 137 Load older lab results   HDL CHOLESTEROL 52 Load older lab results   TRIGLYCERIDES 136 Load older lab results   LDL-CHOLESTEROL 63 Load older lab results   [address] [facility] 2.6 Load older lab results   NON HDL CHOLESTEROL [facility]  01/29/2021    Component 01/29/2021         CREATININE, RANDOM URINE 47 Load older lab results   MICROALBUMIN 78.6 Load older lab results   MICROALBUMIN/CREATININE RATIO, RANDOM URINE 1,672 High            Component 10/12/2021 01/29/2021 06/04/2018         HEMOGLOBIN A1c -- 6.9 High      6.7 High        Hemoglobin A1C 7.5 High      -- --     Reports HbA1C 11/22 6.7%    Impression:  1. Type 2 diabetes mellitus with hyperglycemia, with long-term current use of insulin ([address])    2. Type 2 diabetes mellitus with stage 3a chronic kidney disease, with long-term current use of insulin ([address])    3. Mixed hyperlipidemia    4. Chronic renal disease, stage IV ([address])         Plan:    Livan Brannon was seen today for diabetes type 2. Diagnoses and all orders for this visit:    Type 2 diabetes mellitus with hyperglycemia, with long-term current use of insulin ([facility])  -     Hemoglobin A1C; Future  -     Comprehensive metabolic panel; Future  -     Albumin / creatinine urine ratio; Future  -     Lipid panel; Future    Type 2 diabetes mellitus with stage 3a chronic kidney disease, with long-term current use of insulin (HCC)  -     Hemoglobin A1C; Future  -     Comprehensive metabolic panel; Future  -     Albumin / creatinine urine ratio;  Future  - Lipid panel; Future    Mixed hyperlipidemia  -     Hemoglobin A1C; Future  -     Comprehensive metabolic panel; Future  -     Albumin / creatinine urine ratio; Future  -     Lipid panel; Future    Chronic renal disease, stage IV (HCC)  -     Hemoglobin A1C; Future  -     Comprehensive metabolic panel; Future  -     Albumin / creatinine urine ratio; Future  -     Lipid panel; Future      Patient is a 69yM With PMHx of T2DM since 9799 with complication of macroalbuminuria w CKD3 without any known retinopathy, CVA 07/23, neuropathy, also positive necrobiosis lipodica- follows with podiatry with other PMHx of hypertension, hyperlipidemia and overweight who was seen today for diabetic care. Last visit 08/23. 1) T2DM:- No A1c or BG data today to review or make any regime changes. Will simple recommend starting trulicity for stroke prevention. Asked patient to get labs done tomorrow and also bring reader to clinic for download and review. C/w current regime for now     Plan  - c/w humalog 50:50 25u in Am and  28u in PM  - c/w jardiance 25mg daily   - Start trulicity 7.91WQ weekly   - bring logs for next visit   - Obtain labs for HbA1C, lipid and urine studies now  - c/w follow up with podiatry and also nephrology     Screening:-   Retinopathy- reportedly uptodate, will attempt to obtain records  Nephropathy- Abnormal 350, c/w nephrology follow up, c/w jardiance to 25mg and c/w also irbesartan   Lipide levels- LDL <100, at goal c/w Lipitor 40mg daily     2) Hypertension:- BP in clinic 122/78, c/w irbesartan, Catapres, Norvasc and Clonidine  3) Hyperlipidemia:- LDL at goal <100, c/w lipitor 40mg daily for now    RTC in 6 weeks     Discussed with the patient and all questioned fully answered. He will call me if any problems arise.     Counseled patient on diagnostic results, prognosis, risk and benefit of treatment options, instruction for management, importance of treatment compliance, Risk  factor reduction and Paulette Jenkins MD

## 2023-10-12 ENCOUNTER — TELEPHONE (OUTPATIENT)
Dept: ENDOCRINOLOGY | Facility: HOSPITAL | Age: 78
End: 2023-10-12

## 2023-10-12 NOTE — TELEPHONE ENCOUNTER
The patient's spouse called and stated that she received a notification from her insurance company that medical services are not being covered for an appointment with Dr. Breanna Ramsey on 8/16/2023.       I was able to give Krista the number to the billing department for her to call them to see if they are able to tell her what is needed to be changed

## 2023-12-09 DIAGNOSIS — Z79.4 TYPE 2 DIABETES MELLITUS WITH HYPERGLYCEMIA, WITH LONG-TERM CURRENT USE OF INSULIN (HCC): ICD-10-CM

## 2023-12-09 DIAGNOSIS — E11.65 TYPE 2 DIABETES MELLITUS WITH HYPERGLYCEMIA, WITH LONG-TERM CURRENT USE OF INSULIN (HCC): ICD-10-CM

## 2023-12-13 ENCOUNTER — DOCUMENTATION (OUTPATIENT)
Dept: ENDOCRINOLOGY | Facility: HOSPITAL | Age: 78
End: 2023-12-13

## 2024-02-15 ENCOUNTER — HOSPITAL ENCOUNTER (EMERGENCY)
Dept: HOSPITAL 99 - EMR | Age: 79
Discharge: HOME | End: 2024-02-15
Payer: COMMERCIAL

## 2024-02-15 VITALS — SYSTOLIC BLOOD PRESSURE: 174 MMHG | DIASTOLIC BLOOD PRESSURE: 62 MMHG

## 2024-02-15 VITALS — DIASTOLIC BLOOD PRESSURE: 48 MMHG | RESPIRATION RATE: 18 BRPM | SYSTOLIC BLOOD PRESSURE: 151 MMHG

## 2024-02-15 VITALS — BODY MASS INDEX: 31.9 KG/M2

## 2024-02-15 DIAGNOSIS — Z79.01: ICD-10-CM

## 2024-02-15 DIAGNOSIS — S01.112A: Primary | ICD-10-CM

## 2024-02-15 DIAGNOSIS — S02.2XXB: ICD-10-CM
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
## 2024-02-15 DIAGNOSIS — Z23: ICD-10-CM

## 2024-02-15 LAB — GLUCOSE - POINT OF CARE: 140 MG/DL (ref 70–99)

## 2024-02-15 PROCEDURE — 12013 RPR F/E/E/N/L/M 2.6-5.0 CM: CPT

## 2024-02-15 PROCEDURE — 99285 EMERGENCY DEPT VISIT HI MDM: CPT

## 2024-02-15 PROCEDURE — 90471 IMMUNIZATION ADMIN: CPT

## 2024-02-15 RX ADMIN — CEPHALEXIN 250 MG: 250 CAPSULE ORAL at 15:51

## 2024-02-15 RX ADMIN — CLOSTRIDIUM TETANI TOXOID ANTIGEN (FORMALDEHYDE INACTIVATED), CORYNEBACTERIUM DIPHTHERIAE TOXOID ANTIGEN (FORMALDEHYDE INACTIVATED), BORDETELLA PERTUSSIS TOXOID ANTIGEN (GLUTARALDEHYDE INACTIVATED), BORDETELLA PERTUSSIS FILAMENTOUS HEMAGGLUTININ ANTIGEN (FORMALDEHYDE INACTIVATED), BORDETELLA PERTUSSIS PERTACTIN ANTIGEN, AND BORDETELLA PERTUSSIS FIMBRIAE 2/3 ANTIGEN 0.5 ML: 5; 2; 2.5; 5; 3; 5 INJECTION, SUSPENSION INTRAMUSCULAR at 16:44

## 2024-07-01 DIAGNOSIS — Z79.4 TYPE 2 DIABETES MELLITUS WITH STAGE 3A CHRONIC KIDNEY DISEASE, WITH LONG-TERM CURRENT USE OF INSULIN (HCC): ICD-10-CM

## 2024-07-01 DIAGNOSIS — Z79.4 TYPE 2 DIABETES MELLITUS WITH HYPERGLYCEMIA, WITH LONG-TERM CURRENT USE OF INSULIN (HCC): ICD-10-CM

## 2024-07-01 DIAGNOSIS — E11.22 TYPE 2 DIABETES MELLITUS WITH STAGE 3A CHRONIC KIDNEY DISEASE, WITH LONG-TERM CURRENT USE OF INSULIN (HCC): ICD-10-CM

## 2024-07-01 DIAGNOSIS — E11.65 TYPE 2 DIABETES MELLITUS WITH HYPERGLYCEMIA, WITH LONG-TERM CURRENT USE OF INSULIN (HCC): ICD-10-CM

## 2024-07-01 DIAGNOSIS — N18.31 TYPE 2 DIABETES MELLITUS WITH STAGE 3A CHRONIC KIDNEY DISEASE, WITH LONG-TERM CURRENT USE OF INSULIN (HCC): ICD-10-CM

## 2024-07-01 RX ORDER — EMPAGLIFLOZIN 25 MG/1
25 TABLET, FILM COATED ORAL EVERY MORNING
Qty: 100 TABLET | Refills: 1 | Status: SHIPPED | OUTPATIENT
Start: 2024-07-01

## 2024-12-21 DIAGNOSIS — N18.31 TYPE 2 DIABETES MELLITUS WITH STAGE 3A CHRONIC KIDNEY DISEASE, WITH LONG-TERM CURRENT USE OF INSULIN (HCC): ICD-10-CM

## 2024-12-21 DIAGNOSIS — Z79.4 TYPE 2 DIABETES MELLITUS WITH STAGE 3A CHRONIC KIDNEY DISEASE, WITH LONG-TERM CURRENT USE OF INSULIN (HCC): ICD-10-CM

## 2024-12-21 DIAGNOSIS — E11.65 TYPE 2 DIABETES MELLITUS WITH HYPERGLYCEMIA, WITH LONG-TERM CURRENT USE OF INSULIN (HCC): ICD-10-CM

## 2024-12-21 DIAGNOSIS — E11.22 TYPE 2 DIABETES MELLITUS WITH STAGE 3A CHRONIC KIDNEY DISEASE, WITH LONG-TERM CURRENT USE OF INSULIN (HCC): ICD-10-CM

## 2024-12-21 DIAGNOSIS — Z79.4 TYPE 2 DIABETES MELLITUS WITH HYPERGLYCEMIA, WITH LONG-TERM CURRENT USE OF INSULIN (HCC): ICD-10-CM

## 2024-12-23 RX ORDER — EMPAGLIFLOZIN 25 MG/1
25 TABLET, FILM COATED ORAL EVERY MORNING
Qty: 100 TABLET | Refills: 1 | Status: SHIPPED | OUTPATIENT
Start: 2024-12-23

## 2025-01-08 DIAGNOSIS — E11.65 TYPE 2 DIABETES MELLITUS WITH HYPERGLYCEMIA, WITH LONG-TERM CURRENT USE OF INSULIN (HCC): ICD-10-CM

## 2025-01-08 DIAGNOSIS — Z79.4 TYPE 2 DIABETES MELLITUS WITH HYPERGLYCEMIA, WITH LONG-TERM CURRENT USE OF INSULIN (HCC): ICD-10-CM

## 2025-01-08 RX ORDER — PEN NEEDLE, DIABETIC 31 GX5/16"
NEEDLE, DISPOSABLE MISCELLANEOUS
Qty: 200 EACH | Refills: 0 | Status: SHIPPED | OUTPATIENT
Start: 2025-01-08

## 2025-07-26 ENCOUNTER — TELEPHONE (OUTPATIENT)
Dept: OTHER | Facility: OTHER | Age: 80
End: 2025-07-26

## 2025-07-26 NOTE — TELEPHONE ENCOUNTER
Reason for Call: Requesting to cancel Home Health/Hospice Volunteer today.    Caller's Name: Jemima Broderick    Caller's Relationship to Patient: Spouse    Caller's Callback Number: [phone]    Home Health OR Hospice Patient:  Hospice    Patient's wife called in stating she spoke with Jessica [phone] and requested a volunteer to come help out today. She is requesting that be cancelled because her son is there today to help her. Chart reviewed and no visits noted with [facility] scheduled./documented- patient's wife confirmed she is calling into [facility] line today.   Message sent to on-call Home and Hospice RN for review.

## 2025-08-18 ENCOUNTER — TELEPHONE (OUTPATIENT)
Dept: OTHER | Facility: OTHER | Age: 80
End: 2025-08-18

## 2025-08-21 ENCOUNTER — TELEPHONE (OUTPATIENT)
Dept: OTHER | Facility: OTHER | Age: 80
End: 2025-08-21